# Patient Record
Sex: MALE | Race: WHITE | Employment: UNEMPLOYED | URBAN - METROPOLITAN AREA
[De-identification: names, ages, dates, MRNs, and addresses within clinical notes are randomized per-mention and may not be internally consistent; named-entity substitution may affect disease eponyms.]

---

## 2023-10-24 ENCOUNTER — APPOINTMENT (OUTPATIENT)
Dept: GENERAL RADIOLOGY | Age: 53
End: 2023-10-24
Payer: COMMERCIAL

## 2023-10-24 ENCOUNTER — HOSPITAL ENCOUNTER (OUTPATIENT)
Age: 53
Setting detail: OBSERVATION
Discharge: HOME | End: 2023-10-25
Attending: EMERGENCY MEDICINE | Admitting: INTERNAL MEDICINE
Payer: COMMERCIAL

## 2023-10-24 ENCOUNTER — APPOINTMENT (OUTPATIENT)
Dept: MRI IMAGING | Age: 53
End: 2023-10-24
Payer: COMMERCIAL

## 2023-10-24 ENCOUNTER — APPOINTMENT (OUTPATIENT)
Dept: CT IMAGING | Age: 53
End: 2023-10-24
Payer: COMMERCIAL

## 2023-10-24 DIAGNOSIS — R53.1 RIGHT SIDED WEAKNESS: ICD-10-CM

## 2023-10-24 DIAGNOSIS — R20.0 RIGHT SIDED NUMBNESS: ICD-10-CM

## 2023-10-24 DIAGNOSIS — G45.9 TIA (TRANSIENT ISCHEMIC ATTACK): Primary | ICD-10-CM

## 2023-10-24 PROBLEM — R73.9 HYPERGLYCEMIA: Status: ACTIVE | Noted: 2023-10-24

## 2023-10-24 PROBLEM — I65.23 BILATERAL CAROTID ARTERY STENOSIS: Status: ACTIVE | Noted: 2023-05-11

## 2023-10-24 PROBLEM — R29.90 STROKE-LIKE SYMPTOMS: Status: ACTIVE | Noted: 2023-10-24

## 2023-10-24 PROBLEM — I10 ESSENTIAL HYPERTENSION: Status: ACTIVE | Noted: 2023-10-24

## 2023-10-24 PROBLEM — E78.2 MIXED HYPERLIPIDEMIA: Status: ACTIVE | Noted: 2018-04-05

## 2023-10-24 PROBLEM — I20.8 CHRONIC STABLE ANGINA: Status: ACTIVE | Noted: 2023-10-24

## 2023-10-24 PROBLEM — K76.0 FATTY LIVER: Status: ACTIVE | Noted: 2019-02-26

## 2023-10-24 PROBLEM — R00.1 BRADYCARDIA: Status: ACTIVE | Noted: 2023-10-24

## 2023-10-24 LAB
ALBUMIN SERPL-MCNC: 4.6 G/DL (ref 3.4–5)
ALBUMIN/GLOB SERPL: 1.8 {RATIO} (ref 1.1–2.2)
ALP SERPL-CCNC: 76 U/L (ref 40–129)
ALT SERPL-CCNC: 36 U/L (ref 10–40)
ANION GAP SERPL CALCULATED.3IONS-SCNC: 12 MMOL/L (ref 3–16)
ANISOCYTOSIS BLD QL SMEAR: ABNORMAL
AST SERPL-CCNC: 25 U/L (ref 15–37)
BASOPHILS # BLD: 0.1 K/UL (ref 0–0.2)
BASOPHILS NFR BLD: 2 %
BILIRUB SERPL-MCNC: 1.3 MG/DL (ref 0–1)
BUN SERPL-MCNC: 12 MG/DL (ref 7–20)
CALCIUM SERPL-MCNC: 9.4 MG/DL (ref 8.3–10.6)
CHLORIDE SERPL-SCNC: 102 MMOL/L (ref 99–110)
CO2 SERPL-SCNC: 24 MMOL/L (ref 21–32)
CREAT SERPL-MCNC: 0.9 MG/DL (ref 0.9–1.3)
DEPRECATED RDW RBC AUTO: 13.5 % (ref 12.4–15.4)
EKG ATRIAL RATE: 59 BPM
EKG DIAGNOSIS: NORMAL
EKG P AXIS: 37 DEGREES
EKG P-R INTERVAL: 178 MS
EKG Q-T INTERVAL: 422 MS
EKG QRS DURATION: 98 MS
EKG QTC CALCULATION (BAZETT): 417 MS
EKG R AXIS: -4 DEGREES
EKG T AXIS: -2 DEGREES
EKG VENTRICULAR RATE: 59 BPM
EOSINOPHIL # BLD: 0.1 K/UL (ref 0–0.6)
EOSINOPHIL NFR BLD: 1 %
GFR SERPLBLD CREATININE-BSD FMLA CKD-EPI: >60 ML/MIN/{1.73_M2}
GLUCOSE BLD-MCNC: 132 MG/DL
GLUCOSE BLD-MCNC: 132 MG/DL (ref 70–99)
GLUCOSE SERPL-MCNC: 139 MG/DL (ref 70–99)
HCT VFR BLD AUTO: 43.9 % (ref 40.5–52.5)
HGB BLD-MCNC: 15.2 G/DL (ref 13.5–17.5)
HYPOCHROMIA BLD QL SMEAR: ABNORMAL
INR PPP: 1.06 (ref 0.84–1.16)
LYMPHOCYTES # BLD: 1.5 K/UL (ref 1–5.1)
LYMPHOCYTES NFR BLD: 17 %
MCH RBC QN AUTO: 31.8 PG (ref 26–34)
MCHC RBC AUTO-ENTMCNC: 34.6 G/DL (ref 31–36)
MCV RBC AUTO: 91.7 FL (ref 80–100)
MONOCYTES # BLD: 0.5 K/UL (ref 0–1.3)
MONOCYTES NFR BLD: 8 %
NEUTROPHILS # BLD: 3.6 K/UL (ref 1.7–7.7)
NEUTROPHILS NFR BLD: 62 %
NEUTS BAND NFR BLD MANUAL: 1 % (ref 0–7)
PERFORMED ON: ABNORMAL
PLATELET # BLD AUTO: 139 K/UL (ref 135–450)
PLATELET BLD QL SMEAR: ADEQUATE
PMV BLD AUTO: 7.4 FL (ref 5–10.5)
POTASSIUM SERPL-SCNC: 3.9 MMOL/L (ref 3.5–5.1)
PROT SERPL-MCNC: 7.1 G/DL (ref 6.4–8.2)
PROTHROMBIN TIME: 13.8 SEC (ref 11.5–14.8)
RBC # BLD AUTO: 4.79 M/UL (ref 4.2–5.9)
SLIDE REVIEW: ABNORMAL
SODIUM SERPL-SCNC: 138 MMOL/L (ref 136–145)
TROPONIN, HIGH SENSITIVITY: <6 NG/L (ref 0–22)
VARIANT LYMPHS NFR BLD MANUAL: 9 % (ref 0–6)
WBC # BLD AUTO: 5.7 K/UL (ref 4–11)

## 2023-10-24 PROCEDURE — 6360000004 HC RX CONTRAST MEDICATION: Performed by: EMERGENCY MEDICINE

## 2023-10-24 PROCEDURE — 93010 ELECTROCARDIOGRAM REPORT: CPT | Performed by: INTERNAL MEDICINE

## 2023-10-24 PROCEDURE — 83036 HEMOGLOBIN GLYCOSYLATED A1C: CPT

## 2023-10-24 PROCEDURE — G0378 HOSPITAL OBSERVATION PER HR: HCPCS

## 2023-10-24 PROCEDURE — 85610 PROTHROMBIN TIME: CPT

## 2023-10-24 PROCEDURE — 6370000000 HC RX 637 (ALT 250 FOR IP)

## 2023-10-24 PROCEDURE — 85025 COMPLETE CBC W/AUTO DIFF WBC: CPT

## 2023-10-24 PROCEDURE — 70498 CT ANGIOGRAPHY NECK: CPT

## 2023-10-24 PROCEDURE — 70450 CT HEAD/BRAIN W/O DYE: CPT

## 2023-10-24 PROCEDURE — 84484 ASSAY OF TROPONIN QUANT: CPT

## 2023-10-24 PROCEDURE — 99222 1ST HOSP IP/OBS MODERATE 55: CPT

## 2023-10-24 PROCEDURE — 80053 COMPREHEN METABOLIC PANEL: CPT

## 2023-10-24 PROCEDURE — 70551 MRI BRAIN STEM W/O DYE: CPT

## 2023-10-24 PROCEDURE — 2580000003 HC RX 258

## 2023-10-24 PROCEDURE — 36415 COLL VENOUS BLD VENIPUNCTURE: CPT

## 2023-10-24 PROCEDURE — 99223 1ST HOSP IP/OBS HIGH 75: CPT | Performed by: PSYCHIATRY & NEUROLOGY

## 2023-10-24 PROCEDURE — 93005 ELECTROCARDIOGRAM TRACING: CPT | Performed by: EMERGENCY MEDICINE

## 2023-10-24 PROCEDURE — 99285 EMERGENCY DEPT VISIT HI MDM: CPT

## 2023-10-24 PROCEDURE — 6370000000 HC RX 637 (ALT 250 FOR IP): Performed by: EMERGENCY MEDICINE

## 2023-10-24 PROCEDURE — 71045 X-RAY EXAM CHEST 1 VIEW: CPT

## 2023-10-24 PROCEDURE — 6360000002 HC RX W HCPCS

## 2023-10-24 RX ORDER — ENOXAPARIN SODIUM 100 MG/ML
30 INJECTION SUBCUTANEOUS 2 TIMES DAILY
Status: DISCONTINUED | OUTPATIENT
Start: 2023-10-24 | End: 2023-10-25 | Stop reason: HOSPADM

## 2023-10-24 RX ORDER — ASPIRIN 81 MG/1
243 TABLET, CHEWABLE ORAL ONCE
Status: COMPLETED | OUTPATIENT
Start: 2023-10-24 | End: 2023-10-24

## 2023-10-24 RX ORDER — EVOLOCUMAB 140 MG/ML
140 INJECTION, SOLUTION SUBCUTANEOUS
COMMUNITY
Start: 2023-08-05

## 2023-10-24 RX ORDER — NEBIVOLOL 10 MG/1
10 TABLET ORAL DAILY
COMMUNITY
Start: 2023-09-29

## 2023-10-24 RX ORDER — SODIUM CHLORIDE 0.9 % (FLUSH) 0.9 %
5-40 SYRINGE (ML) INJECTION EVERY 12 HOURS SCHEDULED
Status: DISCONTINUED | OUTPATIENT
Start: 2023-10-24 | End: 2023-10-25 | Stop reason: HOSPADM

## 2023-10-24 RX ORDER — ATORVASTATIN CALCIUM 40 MG/1
80 TABLET, FILM COATED ORAL NIGHTLY
Status: DISCONTINUED | OUTPATIENT
Start: 2023-10-24 | End: 2023-10-24

## 2023-10-24 RX ORDER — ASPIRIN 81 MG/1
81 TABLET, CHEWABLE ORAL DAILY
Status: DISCONTINUED | OUTPATIENT
Start: 2023-10-25 | End: 2023-10-25 | Stop reason: HOSPADM

## 2023-10-24 RX ORDER — SODIUM CHLORIDE 9 MG/ML
INJECTION, SOLUTION INTRAVENOUS PRN
Status: DISCONTINUED | OUTPATIENT
Start: 2023-10-24 | End: 2023-10-25 | Stop reason: HOSPADM

## 2023-10-24 RX ORDER — SODIUM CHLORIDE 0.9 % (FLUSH) 0.9 %
5-40 SYRINGE (ML) INJECTION PRN
Status: DISCONTINUED | OUTPATIENT
Start: 2023-10-24 | End: 2023-10-25 | Stop reason: HOSPADM

## 2023-10-24 RX ORDER — FLUTICASONE PROPIONATE 50 MCG
2 SPRAY, SUSPENSION (ML) NASAL DAILY
COMMUNITY
Start: 2023-04-28

## 2023-10-24 RX ORDER — ASPIRIN 300 MG/1
300 SUPPOSITORY RECTAL DAILY
Status: DISCONTINUED | OUTPATIENT
Start: 2023-10-25 | End: 2023-10-25 | Stop reason: HOSPADM

## 2023-10-24 RX ORDER — MECLIZINE HCL 25MG 25 MG/1
25 TABLET, CHEWABLE ORAL ONCE
Status: COMPLETED | OUTPATIENT
Start: 2023-10-24 | End: 2023-10-24

## 2023-10-24 RX ORDER — LABETALOL HYDROCHLORIDE 5 MG/ML
10 INJECTION, SOLUTION INTRAVENOUS EVERY 10 MIN PRN
Status: DISCONTINUED | OUTPATIENT
Start: 2023-10-24 | End: 2023-10-25 | Stop reason: HOSPADM

## 2023-10-24 RX ORDER — POLYETHYLENE GLYCOL 3350 17 G/17G
17 POWDER, FOR SOLUTION ORAL DAILY PRN
Status: DISCONTINUED | OUTPATIENT
Start: 2023-10-24 | End: 2023-10-25 | Stop reason: HOSPADM

## 2023-10-24 RX ORDER — ONDANSETRON 2 MG/ML
4 INJECTION INTRAMUSCULAR; INTRAVENOUS EVERY 6 HOURS PRN
Status: DISCONTINUED | OUTPATIENT
Start: 2023-10-24 | End: 2023-10-25 | Stop reason: HOSPADM

## 2023-10-24 RX ORDER — ONDANSETRON 4 MG/1
4 TABLET, ORALLY DISINTEGRATING ORAL EVERY 8 HOURS PRN
Status: DISCONTINUED | OUTPATIENT
Start: 2023-10-24 | End: 2023-10-25 | Stop reason: HOSPADM

## 2023-10-24 RX ORDER — ATORVASTATIN CALCIUM 10 MG/1
10 TABLET, FILM COATED ORAL DAILY
COMMUNITY
Start: 2023-09-25

## 2023-10-24 RX ORDER — ISOSORBIDE MONONITRATE 30 MG/1
TABLET, EXTENDED RELEASE ORAL
COMMUNITY
Start: 2023-10-21

## 2023-10-24 RX ORDER — AMLODIPINE BESYLATE 5 MG/1
5 TABLET ORAL DAILY
COMMUNITY
Start: 2023-08-07

## 2023-10-24 RX ORDER — ASPIRIN 81 MG/1
81 TABLET, COATED ORAL DAILY
COMMUNITY
Start: 2023-08-11

## 2023-10-24 RX ORDER — ATORVASTATIN CALCIUM 10 MG/1
10 TABLET, FILM COATED ORAL NIGHTLY
Status: DISCONTINUED | OUTPATIENT
Start: 2023-10-24 | End: 2023-10-25 | Stop reason: HOSPADM

## 2023-10-24 RX ADMIN — MECLIZINE HYDROCHLORIDE 25 MG: 25 TABLET, CHEWABLE ORAL at 13:58

## 2023-10-24 RX ADMIN — Medication 10 ML: at 20:52

## 2023-10-24 RX ADMIN — ASPIRIN 243 MG: 81 TABLET, CHEWABLE ORAL at 11:12

## 2023-10-24 RX ADMIN — IOPAMIDOL 75 ML: 755 INJECTION, SOLUTION INTRAVENOUS at 09:49

## 2023-10-24 ASSESSMENT — PAIN SCALES - GENERAL
PAINLEVEL_OUTOF10: 2
PAINLEVEL_OUTOF10: 0

## 2023-10-24 ASSESSMENT — PAIN DESCRIPTION - DESCRIPTORS: DESCRIPTORS: ACHING

## 2023-10-24 ASSESSMENT — PAIN DESCRIPTION - LOCATION: LOCATION: HEAD

## 2023-10-24 ASSESSMENT — LIFESTYLE VARIABLES
HOW OFTEN DO YOU HAVE A DRINK CONTAINING ALCOHOL: NEVER
HOW MANY STANDARD DRINKS CONTAINING ALCOHOL DO YOU HAVE ON A TYPICAL DAY: PATIENT DOES NOT DRINK

## 2023-10-24 ASSESSMENT — PAIN - FUNCTIONAL ASSESSMENT
PAIN_FUNCTIONAL_ASSESSMENT: 0-10
PAIN_FUNCTIONAL_ASSESSMENT: 0-10
PAIN_FUNCTIONAL_ASSESSMENT: NONE - DENIES PAIN

## 2023-10-24 NOTE — PLAN OF CARE
STROKE TEAM PLAN OF CARE    Name:  Ayala Manzano  : 1970  MRN:  [id number]  Today's Date: 10/24/2023    Stroke team contacted at: 09:40  History obtained from: emergency physician    History     Ayala Manzano is a 52 y.o. male who presented with RUE weakness. Briefly, pt was LKW at 08:15 when he noticed numbness and weakness in the RUE and difficulty using the RUE. He feels that symptoms have since resolved. In the ED, no weakness and no decreased sensation in the RUE. Imaging     CTA HEAD NECK W CONTRAST   Final Result   1. No flow limiting stenosis or dissection of the cervical carotid/vertebral   arteries. 2. No significant stenosis or large vessel occlusion of the pdnpyq-co-Sligli. CT HEAD WO CONTRAST   Final Result   No acute intracranial abnormality. Findings were discussed with Tyrell Jaffe at 9:50 am on 10/24/2023. Acute Ischemic Stroke Clinical Decision Making     (1) Intravenous thrombolysis:  The patient is not a candidate for IV thrombolysis based on:  Symptoms nondisabling / rapidly improving / resolved. (2) Angiography / Thrombectomy:  The patient does not have evidence of a proximal large vessel occlusion on CTA, and therefore is not an EVT candidate. For any additional questions regarding acute stroke care, please feel free to contact the  Stroke Team at [phone]. Recommendations communicated to primary team in real-time.  Late entry at:    MD Avel   Stroke Team   10/24/2023 11:17 AM

## 2023-10-24 NOTE — ED NOTES
Pt advised that he doesn't want to stay and would like to follow up with Neurology in an outpatient basis.  Dr. Maria Luisa Flores made aware     Latonya Mayers RN  10/24/23 1122       Latonya Mayers RN  10/24/23 1122

## 2023-10-24 NOTE — ED NOTES
1102-Perfect Serve sent by Dr. Ang Nolan to Dr. Tata Xiong for consult. Katharina Allen  10/24/23 1114  1146-Call back received from Susie PFEIFFER spoke with Dr. Ang Nolan.       Katharina Allen  10/24/23 4676

## 2023-10-24 NOTE — H&P
Hospital Medicine History & Physical      PCP: Janneth Salazar MD    Date of Admission: 10/24/2023    Date of Service: Pt seen/examined on 10/24/23     Chief Complaint:    Chief Complaint   Patient presents with    Numbness     Pt woke up around 8am and states he body felt numb and tingling. Feels more numb in his right arm. Does have equal , denies pain.          History Of Present Illness: The patient is a 52 y.o. male with pmhx as below who presents to [facility] with numbness and weakness in his right arm and leg that began this morning around 8 am when he woke up. He felt like he was dragging his right leg and had trouble using his right arm with confusion and loss of balance. Had to use the walls to ambulate and for support. Last known well last evening around 11 pm when he went to bed. Reports slightly fuzzy vision, dizziness, and lightheadedness but has been dealing with those symptoms for years. Denies any dysarthria, diplopia, headache, chest pain, shortness of breath. History obtained from the patient and review of EMR. Past Medical History:        Diagnosis Date    ASHD (arteriosclerotic heart disease)     Carotid artery stenosis     CHF (congestive heart failure) (HCC)     Chronic stable angina     Claudication (HCC)     Fatty liver     HLD (hyperlipidemia)     Hypertension        Past Surgical History:        Procedure Laterality Date    ANKLE SURGERY      CORONARY ANGIOPLASTY WITH STENT PLACEMENT         Medications Prior to Admission:    Prior to Admission medications    Medication Sig Start Date End Date Taking?  Authorizing Provider   Evolocumab (REPATHA SURECLICK) 700 MG/ML SOAJ Inject 140 mg into the skin every 14 days 8/5/23  Yes ProviderAdama MD   fluticasone (FLONASE) 50 MCG/ACT nasal spray 2 sprays by Each Nostril route daily 4/28/23  Yes ProviderAdama MD   nebivolol (BYSTOLIC) 10 MG tablet Take 1 tablet by mouth daily 9/29/23  Yes ProviderAdama

## 2023-10-24 NOTE — PLAN OF CARE
Problem: Discharge Planning  Goal: Discharge to home or other facility with appropriate resources  Outcome: Progressing  Flowsheets (Taken 10/24/2023 0283)  Discharge to home or other facility with appropriate resources: Identify barriers to discharge with patient and caregiver

## 2023-10-24 NOTE — ED NOTES
Report given to [facility], RN.  Pt transported to  213 via wheelchair with GEO Rogel RN  10/24/23 7330

## 2023-10-24 NOTE — ED NOTES
0930-Code Stroke called over head. 0933-CT called and made aware of Code Stroke. 0934-Call placed to  Stroke team for Dr. Mook Westborok. 0935-Lab called and made aware of Code Stroke. 0941-Call back received from  Stroke team Dr. Eleanor Vergara, spoke with Dr. Mook Westbrook.       Cass Hopkins  10/24/23 0901

## 2023-10-24 NOTE — CONSULTS
Neurology consultation note    Patient name: Yinka Coates      Chief Complaint:  New onset of right-sided weakness. History of present illness: This is a 52years old right-handed male. The patient was brought to the hospital this morning due to the new onset of right-sided weakness. The patient reports preceding lightheadedness prior to this weakness. The patient reveals history of  recurrent spell with lightheadedness for years. It occurs at least once or twice a month. It can last vary up to the whole day. The patient also reports of generalized weakness after the spell. However, the patient never had unilateral weakness with this spell. This is the first time when he experienced weakness on the right side. The patient reports a spontaneous improvement during my assessment. But his legs is still weak compared to the left. His blood pressure was slightly elevated upon admission. The patient reveals history of possible CVA or seizure back in 1990s.     Past medical history:    Past Medical History:   Diagnosis Date    ASHD (arteriosclerotic heart disease)     Carotid artery stenosis     CHF (congestive heart failure) (HCC)     Chronic stable angina     Claudication (HCC)     Fatty liver     HLD (hyperlipidemia)     Hypertension        Past surgical history:    Past Surgical History:   Procedure Laterality Date    ANKLE SURGERY      CORONARY ANGIOPLASTY WITH STENT PLACEMENT          Medication:    Current Facility-Administered Medications   Medication Dose Route Frequency Provider Last Rate Last Admin    sodium chloride flush 0.9 % injection 5-40 mL  5-40 mL IntraVENous 2 times per day Dominguez Asa, APRN - CNP        sodium chloride flush 0.9 % injection 5-40 mL  5-40 mL IntraVENous PRN Dominguez Asa, APRN - CNP        0.9 % sodium chloride infusion   IntraVENous PRN Dominguez Asa, APRN - CNP        ondansetron (ZOFRAN-ODT) disintegrating tablet 4 mg  4 mg Oral Q8H PRN Dominguez Asa, APRN -

## 2023-10-25 VITALS
HEART RATE: 58 BPM | HEIGHT: 71 IN | RESPIRATION RATE: 16 BRPM | DIASTOLIC BLOOD PRESSURE: 93 MMHG | SYSTOLIC BLOOD PRESSURE: 148 MMHG | TEMPERATURE: 97.5 F | OXYGEN SATURATION: 98 % | BODY MASS INDEX: 38.08 KG/M2 | WEIGHT: 272 LBS

## 2023-10-25 LAB
ANION GAP SERPL CALCULATED.3IONS-SCNC: 11 MMOL/L (ref 3–16)
BASOPHILS # BLD: 0 K/UL (ref 0–0.2)
BASOPHILS NFR BLD: 0.4 %
BUN SERPL-MCNC: 13 MG/DL (ref 7–20)
CALCIUM SERPL-MCNC: 9.2 MG/DL (ref 8.3–10.6)
CHLORIDE SERPL-SCNC: 105 MMOL/L (ref 99–110)
CHOLEST SERPL-MCNC: 75 MG/DL (ref 0–199)
CO2 SERPL-SCNC: 23 MMOL/L (ref 21–32)
CREAT SERPL-MCNC: 0.7 MG/DL (ref 0.9–1.3)
DEPRECATED RDW RBC AUTO: 14 % (ref 12.4–15.4)
EOSINOPHIL # BLD: 0.1 K/UL (ref 0–0.6)
EOSINOPHIL NFR BLD: 1 %
EST. AVERAGE GLUCOSE BLD GHB EST-MCNC: 102.5 MG/DL
GFR SERPLBLD CREATININE-BSD FMLA CKD-EPI: >60 ML/MIN/{1.73_M2}
GLUCOSE SERPL-MCNC: 137 MG/DL (ref 70–99)
HBA1C MFR BLD: 5.2 %
HCT VFR BLD AUTO: 44.7 % (ref 40.5–52.5)
HDLC SERPL-MCNC: 40 MG/DL (ref 40–60)
HGB BLD-MCNC: 15.4 G/DL (ref 13.5–17.5)
LDLC SERPL CALC-MCNC: 8 MG/DL
LYMPHOCYTES # BLD: 1.2 K/UL (ref 1–5.1)
LYMPHOCYTES NFR BLD: 18.6 %
MCH RBC QN AUTO: 32 PG (ref 26–34)
MCHC RBC AUTO-ENTMCNC: 34.5 G/DL (ref 31–36)
MCV RBC AUTO: 92.7 FL (ref 80–100)
MONOCYTES # BLD: 0.4 K/UL (ref 0–1.3)
MONOCYTES NFR BLD: 6.9 %
NEUTROPHILS # BLD: 4.7 K/UL (ref 1.7–7.7)
NEUTROPHILS NFR BLD: 73.1 %
PLATELET # BLD AUTO: 136 K/UL (ref 135–450)
PMV BLD AUTO: 7.7 FL (ref 5–10.5)
POTASSIUM SERPL-SCNC: 4.1 MMOL/L (ref 3.5–5.1)
RBC # BLD AUTO: 4.82 M/UL (ref 4.2–5.9)
SODIUM SERPL-SCNC: 139 MMOL/L (ref 136–145)
TRIGL SERPL-MCNC: 136 MG/DL (ref 0–150)
VLDLC SERPL CALC-MCNC: 27 MG/DL
WBC # BLD AUTO: 6.4 K/UL (ref 4–11)

## 2023-10-25 PROCEDURE — 95816 EEG AWAKE AND DROWSY: CPT

## 2023-10-25 PROCEDURE — 2580000003 HC RX 258

## 2023-10-25 PROCEDURE — 36415 COLL VENOUS BLD VENIPUNCTURE: CPT

## 2023-10-25 PROCEDURE — G0378 HOSPITAL OBSERVATION PER HR: HCPCS

## 2023-10-25 PROCEDURE — 80061 LIPID PANEL: CPT

## 2023-10-25 PROCEDURE — 95816 EEG AWAKE AND DROWSY: CPT | Performed by: PSYCHIATRY & NEUROLOGY

## 2023-10-25 PROCEDURE — 6370000000 HC RX 637 (ALT 250 FOR IP)

## 2023-10-25 PROCEDURE — 99233 SBSQ HOSP IP/OBS HIGH 50: CPT | Performed by: PSYCHIATRY & NEUROLOGY

## 2023-10-25 PROCEDURE — 92526 ORAL FUNCTION THERAPY: CPT

## 2023-10-25 PROCEDURE — 80048 BASIC METABOLIC PNL TOTAL CA: CPT

## 2023-10-25 PROCEDURE — 93306 TTE W/DOPPLER COMPLETE: CPT

## 2023-10-25 PROCEDURE — 85025 COMPLETE CBC W/AUTO DIFF WBC: CPT

## 2023-10-25 PROCEDURE — 92610 EVALUATE SWALLOWING FUNCTION: CPT

## 2023-10-25 RX ORDER — AMLODIPINE BESYLATE 5 MG/1
5 TABLET ORAL DAILY
Status: DISCONTINUED | OUTPATIENT
Start: 2023-10-25 | End: 2023-10-25 | Stop reason: HOSPADM

## 2023-10-25 RX ORDER — CLOPIDOGREL BISULFATE 75 MG/1
75 TABLET ORAL DAILY
Qty: 30 TABLET | Refills: 0 | Status: SHIPPED | OUTPATIENT
Start: 2023-10-25

## 2023-10-25 RX ADMIN — AMLODIPINE BESYLATE 5 MG: 5 TABLET ORAL at 13:17

## 2023-10-25 RX ADMIN — ASPIRIN 81 MG: 81 TABLET, CHEWABLE ORAL at 09:37

## 2023-10-25 RX ADMIN — Medication 10 ML: at 09:37

## 2023-10-25 NOTE — PLAN OF CARE
Problem: Discharge Planning  Goal: Discharge to home or other facility with appropriate resources  10/25/2023 1339 by Shahnaz Servin RN  Outcome: Progressing     Problem: Chronic Conditions and Co-morbidities  Goal: Patient's chronic conditions and co-morbidity symptoms are monitored and maintained or improved  Outcome: Progressing

## 2023-10-25 NOTE — PLAN OF CARE
Problem: SLP Adult - Impaired Swallowing  Goal: By Discharge: Advance to least restrictive diet without signs or symptoms of aspiration for planned discharge setting. See evaluation for individualized goals. Note: SLP completed evaluation. Please refer to notes in EMR.     Sebastien Buckley  SLP  Clinician    Supervisor: Mark Lynn, [address] [facility]

## 2023-10-25 NOTE — PROCEDURES
INTERPRETATION:  This 25-minute, computer-assisted video EEG recording is mildly abnormal.  It showed mild degree of generalized slowing background activity. No potentially epileptiform activity was present during the recording. The EEG findings were consistent with mild degree of generalized non-specific cerebral dysfunction. CLASSIFICATION:  Dysrhythmia grade 1, generalized. Sleep - unsuccessful. EKG channel. DESCRIPTION:    BACKGROUND:  The awake recording revealed 9 Hz alpha activity over the posterior head region. There were increased 4 to 7 Hz theta activity into the EEG background. Given the extensive study, the patient still did not sleep. The EEG showed normal V waves during drowsiness. There was no significant change on the EEG background with photic stimulation. Hyperventilation was omitted due to underlying cardiac disorder. INTERICTAL DISCHARGES: None    CLINICAL EVENTS:  None    The EKG channel was unremarkable.

## 2023-10-25 NOTE — PLAN OF CARE
Problem: Discharge Planning  Goal: Discharge to home or other facility with appropriate resources  10/25/2023 1714 by Edison Allison RN  Outcome: Adequate for Discharge  10/25/2023 1339 by Marty Bautista RN  Outcome: Progressing     Problem: Chronic Conditions and Co-morbidities  Goal: Patient's chronic conditions and co-morbidity symptoms are monitored and maintained or improved  10/25/2023 1714 by Edison Allison RN  Outcome: Adequate for Discharge  10/25/2023 1339 by Marty Bautista RN  Outcome: Progressing     Problem: SLP Adult - Impaired Swallowing  Goal: By Discharge: Advance to least restrictive diet without signs or symptoms of aspiration for planned discharge setting. See evaluation for individualized goals. 10/25/2023 0947 by Torrey Ziegler  Note: SLP completed evaluation. Please refer to notes in EMR.        Problem: Neurosensory - Adult  Goal: Achieves maximal functionality and self care  10/25/2023 1714 by Edison Allison RN  Outcome: Adequate for Discharge  10/25/2023 1339 by Marty Bautista RN  Outcome: Progressing

## 2023-10-25 NOTE — CARE COORDINATION
Review of chart screened for potential discharge planning needs. Contact with  patient   Role of discharge planner explained with verbalized understanding. No Needs identified by pt/support person for barriers to discharge. Readmission Risk Score:N/A  No discharge needs noted not following. MD and bedside RN please notify CM if needs arise for any discharge interventions.

## 2023-10-25 NOTE — PLAN OF CARE
Problem: Discharge Planning  Goal: Discharge to home or other facility with appropriate resources  10/25/2023 0119 by Olene Dancer, RN  Outcome: Progressing  Flowsheets (Taken 10/24/2023 2047)  Discharge to home or other facility with appropriate resources: Identify barriers to discharge with patient and caregiver

## 2023-10-26 NOTE — DISCHARGE SUMMARY
during the recording. The EEG findings were consistent with mild degree of generalized non-specific cerebral dysfunction. Discharge Medications     Medication List        START taking these medications      clopidogrel 75 MG tablet  Commonly known as: Plavix  Take 1 tablet by mouth daily            CONTINUE taking these medications      amLODIPine 5 MG tablet  Commonly known as: NORVASC     Aspirin Low Dose 81 MG EC tablet  Generic drug: aspirin     atorvastatin 10 MG tablet  Commonly known as: LIPITOR     fluticasone 50 MCG/ACT nasal spray  Commonly known as: FLONASE     isosorbide mononitrate 30 MG extended release tablet  Commonly known as: IMDUR     nebivolol 10 MG tablet  Commonly known as: BYSTOLIC     Repatha SureClick 361 MG/ML Soaj  Generic drug: Evolocumab               Where to Get Your Medications        These medications were sent to [address]  [address] [place] [address]      Phone: [phone]   clopidogrel 75 MG tablet           Discharged in stable condition to home     Follow Up:   Follow up with PCP in 1 week; F/U with neurology in 4 weeks        Tabitha Diaz MD